# Patient Record
Sex: MALE | Race: OTHER | URBAN - METROPOLITAN AREA
[De-identification: names, ages, dates, MRNs, and addresses within clinical notes are randomized per-mention and may not be internally consistent; named-entity substitution may affect disease eponyms.]

---

## 2019-08-13 ENCOUNTER — TELEPHONE (OUTPATIENT)
Dept: INFECTIOUS DISEASES | Age: 34
End: 2019-08-13

## 2019-08-14 ENCOUNTER — TELEPHONE (OUTPATIENT)
Dept: INFECTIOUS DISEASES | Age: 34
End: 2019-08-14

## 2019-12-03 ENCOUNTER — TELEPHONE (OUTPATIENT)
Dept: INFECTIOUS DISEASES | Age: 34
End: 2019-12-03

## 2020-02-19 ENCOUNTER — TELEPHONE (OUTPATIENT)
Dept: INFECTIOUS DISEASES | Age: 35
End: 2020-02-19

## 2021-01-01 ENCOUNTER — HOSPITAL ENCOUNTER (EMERGENCY)
Age: 36
End: 2021-10-13
Attending: EMERGENCY MEDICINE
Payer: MEDICARE

## 2021-01-01 ENCOUNTER — HOSPITAL ENCOUNTER (EMERGENCY)
Age: 36
Discharge: LEFT BEFORE DISCHARGE | End: 2021-10-05
Payer: MEDICARE

## 2021-01-01 VITALS
OXYGEN SATURATION: 98 % | HEART RATE: 93 BPM | BODY MASS INDEX: 34.01 KG/M2 | WEIGHT: 265 LBS | RESPIRATION RATE: 14 BRPM | HEIGHT: 74 IN | SYSTOLIC BLOOD PRESSURE: 142 MMHG | TEMPERATURE: 97.8 F | DIASTOLIC BLOOD PRESSURE: 96 MMHG

## 2021-01-01 VITALS — OXYGEN SATURATION: 73 %

## 2021-01-01 DIAGNOSIS — I46.9 CARDIORESPIRATORY ARREST (HCC): Primary | ICD-10-CM

## 2021-01-01 DIAGNOSIS — Z53.29 LEFT AGAINST MEDICAL ADVICE: ICD-10-CM

## 2021-01-01 DIAGNOSIS — T40.601A NARCOTIC OVERDOSE, ACCIDENTAL OR UNINTENTIONAL, INITIAL ENCOUNTER (HCC): Primary | ICD-10-CM

## 2021-01-01 PROCEDURE — 92950 HEART/LUNG RESUSCITATION CPR: CPT

## 2021-01-01 PROCEDURE — 99282 EMERGENCY DEPT VISIT SF MDM: CPT

## 2021-01-01 PROCEDURE — 31500 INSERT EMERGENCY AIRWAY: CPT

## 2021-01-01 PROCEDURE — 6360000002 HC RX W HCPCS: Performed by: EMERGENCY MEDICINE

## 2021-01-01 PROCEDURE — 99284 EMERGENCY DEPT VISIT MOD MDM: CPT

## 2021-01-01 RX ORDER — NALOXONE HYDROCHLORIDE 0.4 MG/ML
INJECTION, SOLUTION INTRAMUSCULAR; INTRAVENOUS; SUBCUTANEOUS DAILY PRN
Status: COMPLETED | OUTPATIENT
Start: 2021-01-01 | End: 2021-01-01

## 2021-01-01 RX ORDER — NALOXONE HYDROCHLORIDE 1 MG/ML
INJECTION INTRAMUSCULAR; INTRAVENOUS; SUBCUTANEOUS
Status: DISCONTINUED
Start: 2021-01-01 | End: 2021-01-01 | Stop reason: HOSPADM

## 2021-01-01 RX ORDER — EPINEPHRINE 0.1 MG/ML
SYRINGE (ML) INJECTION DAILY PRN
Status: COMPLETED | OUTPATIENT
Start: 2021-01-01 | End: 2021-01-01

## 2021-01-01 RX ADMIN — EPINEPHRINE 1 MG: 0.1 INJECTION, SOLUTION ENDOTRACHEAL; INTRACARDIAC; INTRAVENOUS at 00:55

## 2021-01-01 RX ADMIN — NALOXONE HYDROCHLORIDE 2 MG: 0.4 INJECTION, SOLUTION INTRAMUSCULAR; INTRAVENOUS; SUBCUTANEOUS at 00:56

## 2021-01-01 RX ADMIN — NALOXONE HYDROCHLORIDE 4 MG: 0.4 INJECTION, SOLUTION INTRAMUSCULAR; INTRAVENOUS; SUBCUTANEOUS at 01:01

## 2021-01-01 ASSESSMENT — ENCOUNTER SYMPTOMS
WHEEZING: 0
COUGH: 0
NAUSEA: 0
VOMITING: 0
SHORTNESS OF BREATH: 0
ABDOMINAL PAIN: 0
PHOTOPHOBIA: 0

## 2021-10-06 NOTE — ED TRIAGE NOTES
Pt presents to ED via EMS following overdose on \"percocet\" Pt states that he took \"two 10mg percocets\". Pt became unresponsive; friend called 911. 4mg narcan given. Pt now a&o x3.

## 2021-10-06 NOTE — ED PROVIDER NOTES
[address] [facility] ED  EMERGENCY DEPARTMENT ENCOUNTER      Pt Name: Fernando Cash  MRN: [id number]  [place] 1985  Date of evaluation: 10/5/2021  Provider: Yuridia Luciano [place]       Chief Complaint   Patient presents with    Drug Overdose     pt unresponsive; 4mg narcan given; pt a&o x3         HISTORY OF PRESENT ILLNESS   (Location/Symptom, Timing/Onset, Context/Setting, Quality, Duration, Modifying Factors, Severity)  Note limiting factors. Fernando Cash is a 43 y.o. male who per chart review has a past medical history of anxiety asthma chronic back pain and diabetes presents to the emergency department for evaluation of drug overdose. Patient states he took two 10 mg Percocet pills while with a friend and became unresponsive. Friend called EMS. Patient received 4 mg of IV Narcan with return to normal mentation. Patient does not want help for his drug use in the ED today. He states he feels \"fine\" and wants to go home because he works all day, asking to leave the ED. Overdose was not intentional. Denies complaints. HPI    Nursing Notes were reviewed. REVIEW OF SYSTEMS    (2-9 systems for level 4, 10 or more for level 5)     Review of Systems   Constitutional: Negative for chills, fatigue and fever. HENT: Negative for congestion. Eyes: Negative for photophobia. Respiratory: Negative for cough, shortness of breath and wheezing. Cardiovascular: Negative for chest pain and palpitations. Gastrointestinal: Negative for abdominal pain, nausea and vomiting. Genitourinary: Negative for dysuria, frequency and hematuria. Musculoskeletal: Negative for myalgias. Allergic/Immunologic: Negative for immunocompromised state. Neurological: Negative for dizziness, weakness and headaches. All other systems reviewed and are negative. Except as noted above the remainder of the review of systems was reviewed and negative.        PAST MEDICAL HISTORY     Past Medical History:   Diagnosis Date    Anxiety     Asthma     Chronic back pain     Diabetes ([facility] [address]          SURGICAL HISTORY     History reviewed. No pertinent surgical history. CURRENT MEDICATIONS       Discharge Medication List as of 10/5/2021 11:26 PM      CONTINUE these medications which have NOT CHANGED    Details   pregabalin (LYRICA) 25 MG capsule Take 1 capsule by mouth every evening Valid 9/4/15-10/4/15, Disp-30 capsule, R-0             ALLERGIES     Penicillins    FAMILY HISTORY     History reviewed. No pertinent family history. SOCIAL HISTORY       Social History     Socioeconomic History    Marital status: Single     Spouse name: None    Number of children: None    Years of education: None    Highest education level: None   Occupational History    None   Tobacco Use    Smoking status: Current Every Day Smoker     Packs/day: 1.00    Smokeless tobacco: Never Used   Vaping Use    Vaping Use: Never used   Substance and Sexual Activity    Alcohol use: No     Alcohol/week: 0.0 standard drinks    Drug use: No    Sexual activity: Never   Other Topics Concern    None   Social History Narrative    None     Social Determinants of Health     Financial Resource Strain:     Difficulty of Paying Living Expenses:    Food Insecurity:     Worried About Running Out of Food in the Last Year:     Ran Out of Food in the Last Year:    Transportation Needs:     Lack of Transportation (Medical):      Lack of Transportation (Non-Medical):    Physical Activity:     Days of Exercise per Week:     Minutes of Exercise per Session:    Stress:     Feeling of Stress :    Social Connections:     Frequency of Communication with Friends and Family:     Frequency of Social Gatherings with Friends and Family:     Attends Church Services:     Active Member of Clubs or Organizations:     Attends Club or Organization Meetings:     Marital Status:    Intimate Partner Violence:     Fear of Current or Ex-Partner:     Emotionally Abused:     Physically Abused:     Sexually Abused:        SCREENINGS                        PHYSICAL EXAM    (up to 7 for level 4, 8 or more for level 5)     ED Triage Vitals [10/05/21 2250]   BP Temp Temp Source Pulse Resp SpO2 Height Weight   (!) 142/96 97.8 °F (36.6 °C) Oral 93 14 98 % 6' 2\" (1.88 m) 265 lb (120.2 kg)       Physical Exam  Constitutional:       General: He is not in acute distress. Appearance: He is well-developed. He is not ill-appearing, toxic-appearing or diaphoretic. HENT:      Head: Normocephalic and atraumatic. Right Ear: Tympanic membrane, ear canal and external ear normal.      Left Ear: Tympanic membrane, ear canal and external ear normal.      Nose: Nose normal.      Mouth/Throat:      Mouth: Mucous membranes are moist.   Eyes:      Pupils: Pupils are equal, round, and reactive to light. Cardiovascular:      Rate and Rhythm: Normal rate and regular rhythm. Pulses: Normal pulses. Heart sounds: No murmur heard. No friction rub. No gallop. Pulmonary:      Effort: Pulmonary effort is normal.      Breath sounds: Normal breath sounds. No wheezing, rhonchi or rales. Abdominal:      General: Bowel sounds are normal. There is no distension. Palpations: Abdomen is soft. Tenderness: There is no abdominal tenderness. There is no guarding or rebound. Musculoskeletal:         General: No swelling. Cervical back: Normal range of motion. Right lower leg: No edema. Left lower leg: No edema. Skin:     General: Skin is warm and dry. Capillary Refill: Capillary refill takes less than 2 seconds. Findings: No rash. Neurological:      General: No focal deficit present. Mental Status: He is alert and oriented to person, place, and time. GCS: GCS eye subscore is 4. GCS verbal subscore is 5. GCS motor subscore is 6. Cranial Nerves: Cranial nerves are intact. Sensory: Sensation is intact. Motor: Motor function is intact. Coordination: Coordination is intact. DIAGNOSTIC RESULTS     EKG: All EKG's are interpreted by the Emergency Department Physician who either signs or Co-signs this chart in the absence of a cardiologist.        RADIOLOGY:   Non-plain film images such as CT, Ultrasound and MRI are read by the radiologist. Plain radiographic images are visualized and preliminarily interpreted by the emergency physician with the below findings:        Interpretation per the Radiologist below, if available at the time of this note:    No orders to display         ED BEDSIDE ULTRASOUND:   Performed by ED Physician - none    LABS:  Labs Reviewed - No data to display    All other labs were within normal range or not returned as of this dictation. EMERGENCY DEPARTMENT COURSE and DIFFERENTIAL DIAGNOSIS/MDM:   Vitals:    Vitals:    10/05/21 2250   BP: (!) 142/96   Pulse: 93   Resp: 14   Temp: 97.8 °F (36.6 °C)   TempSrc: Oral   SpO2: 98%   Weight: 265 lb (120.2 kg)   Height: 6' 2\" (1.88 m)       MDM     Patient is a 20-year-old male presents the ED for evaluation of drug overdose. He is afebrile and hemodynamically stable. He is alert and oriented x3 with a GCS of 15 and no focal deficit. No complaints. Upon arrival to the ED patient is adamant that he wants to leave. He states that he worked all day and is tired and wants to go home. Explained to the patient that I would like to observe him for at least 1 hour to ensure he does not need any additional doses of Narcan however patient refuses. Explained to the patient that he would need to sign out [address] He was given risks of leaving the hospital [address] including but not limited to death disability loss of current lifestyle etc.  Patient verbally demonstrated understanding of these risks and signed the [facility] paperwork himself. He is alert and orient x3 with a steady gait while in the ED.   He is to return

## 2021-10-13 NOTE — ED NOTES
1047- Pt arrives to ED via PA on [facility]. Pt has [place] Airway in place. Bagging by respiratory. Pt easily bagged. Chest rise symmetrical.   0052- Pulse check- Asystole- [facility] resumed, bagging resumed. 0099- IO placed to left dave per Nakul Cowart, NISHA  4564- 1 Epi ampule given by NETTIE Munoz RN, to IO.  8331- 2mg Narcan given by NETTIE Munoz RN, to IO.   5720- Pulse check- Asystole- [facility] resumed, bagging resumed. 0101- ET tube placed by NETTIE 91Chula Landa DO. Tube size 8. 22cm at the lip.  0101- 1 Epi ampule given by NETTIE Munoz RN, to IO.   0101- 4mg Narcan given by NETTIE Munoz RN, to IO.  0102- Pulse check- Asystole- Time of Death called by NETTIE Christie RN  10/13/21 7452

## 2021-10-13 NOTE — ED NOTES
Patient taken to [facility], accompanied by [facility], belongings tagged and sent with body     Leah Johnson RN  10/13/21 2292

## 2021-10-13 NOTE — ED NOTES
Waiting on  to arrive. [facility] police at bedside.      Tiffanie Diana RN  10/13/21 0816

## 2021-10-13 NOTE — ED TRIAGE NOTES
Pt arrives to ED via [facility] in regards to overdose. EMS was called after patient was down for approximately 10 minutes. Pt was unresponsive upon arrival of EMS. Pt was placed on [facility]. Unable to intubate, arnol airway placed. Pt was given 5 epi's and 6mg Narcan en route to hospital. Pt was given 4 narcan nasal sprays by bystanders. EMS states patient was asystole, PEA, Afib (1shock given), and then asystole again. Pt asystole upon arrival to ER.

## 2021-10-13 NOTE — ED PROVIDER NOTES
[address] [facility] ED  EMERGENCY MEDICINE     Pt Name: Anabel Euceda  MRN: [id number]  [place] 1985  Date of evaluation: 10/13/2021  PCP:    Raymond Alejandro  Provider: Magi Aguirre DO    CHIEF COMPLAINT     No chief complaint on file. HISTORY OF PRESENT ILLNESS    HPI     40-year-old male presents to the emergency department unresponsive with ACLS in process. Patient was found by EMS down after he had overdosed on narcotic/heroin at a friend's house. Patient was given 4 of Narcan by the friend intranasally and 6 of Narcan by EMS with no responses. He was had a Helio airway upon arrival.  He had a lot of vomitus upon arrival.  Patient had PEA rhythm throughout with 5 epi. Patient was seen to have a V. fib rhythm and did get shocked by EMS. He had been down already 10 minutes before they got there. They state that they had been running ACLS for 30 minutes. Triage notes and Nursing notes were reviewed by myself. Any discrepancies are addressed above. PAST MEDICAL HISTORY     Past Medical History:   Diagnosis Date    Anxiety     Asthma     Chronic back pain     Diabetes ([facility] [address]        SURGICAL HISTORY     No past surgical history on file. CURRENT MEDICATIONS       Previous Medications    PREGABALIN (LYRICA) 25 MG CAPSULE    Take 1 capsule by mouth every evening Valid 9/4/15-10/4/15       ALLERGIES       Allergies   Allergen Reactions    Penicillins Hives       FAMILY HISTORY     No family history on file. SOCIAL HISTORY       Social History     Socioeconomic History    Marital status: Single     Spouse name: Not on file    Number of children: Not on file    Years of education: Not on file    Highest education level: Not on file   Occupational History    Not on file   Tobacco Use    Smoking status: Current Every Day Smoker     Packs/day: 1.00    Smokeless tobacco: Never Used   Vaping Use    Vaping Use: Never used   Substance and Sexual Activity    Alcohol use:  No Alcohol/week: 0.0 standard drinks    Drug use: No    Sexual activity: Never   Other Topics Concern    Not on file   Social History Narrative    Not on file     Social Determinants of Health     Financial Resource Strain:     Difficulty of Paying Living Expenses:    Food Insecurity:     Worried About Running Out of Food in the Last Year:     [address] in the Last Year:    Transportation Needs:     Lack of Transportation (Medical):  Lack of Transportation (Non-Medical):    Physical Activity:     Days of Exercise per Week:     Minutes of Exercise per Session:    Stress:     Feeling of Stress :    Social Connections:     Frequency of Communication with Friends and Family:     Frequency of Social Gatherings with Friends and Family:     Attends [employer] Services:     Active Member of Clubs or Organizations:     Attends Club or Organization Meetings:     Marital Status:    Intimate Partner Violence:     Fear of Current or Ex-Partner:     Emotionally Abused:     Physically Abused:     Sexually Abused:        REVIEW OF SYSTEMS     Review of Systems   Unable to perform ROS: Acuity of condition       Except as noted above the remainder of the review of systems was reviewed and is negative.   SCREENINGS        [place] Coma Scale  Eye Opening: None  Best Verbal Response: None  Best Motor Response: None  [place] Coma Scale Score: 3               PHYSICAL EXAM    (up to 7 for level 4, 8 or more for level 5)     ED Triage Vitals [10/13/21 0058]   BP Temp Temp src Pulse Resp SpO2 Height Weight   -- -- -- -- -- (!) 73 % -- --       Physical Exam  Constitutional:       Comments: Unresponsive   Eyes:      Comments: Pupils fixed and dilated   Abdominal:      Comments: Copious amounts of vomitus in the airway and on patient           DIAGNOSTIC RESULTS     EKG:(none if blank)  All EKGs are interpreted by the Emergency Department Physician who either signs or Co-signs this chart in the absence of a cardiologist.        RADIOLOGY: (none if blank)   I directly visualized the following images and reviewed the radiologist interpretations. Interpretation per the Radiologist below, if available at the time of this note:  No orders to display       LABS:  Labs Reviewed - No data to display    All other labs were within normal range or not returned as of this dictation. Please note, any cultures that may have been sent were not resulted at the time of this patient visit. EMERGENCY DEPARTMENT COURSE and Medical Decision Making:     Vitals:    Vitals:    10/13/21 0058   SpO2: (!) 73%       PROCEDURES: (None if blank)  Intubation    Date/Time: 10/13/2021 3:09 AM  Performed by: Renee Loving DO  Authorized by: Renee Loving DO     Consent:     Consent obtained:  Emergent situation  Pre-procedure details:     Patient status:  Unresponsive    Mallampati score:  II    Pretreatment medications:  None    Paralytics:  None  Procedure details:     Preoxygenation:  None    CPR in progress: yes      Intubation method:  Oral    Oral intubation technique:  Video-assisted    Laryngoscope blade: Mac 4    Tube size (mm):  8.0    Tube type:  Cuffed    Number of attempts:  1    Ventilation between attempts: no      Cricoid pressure: no      Tube visualized through cords: yes    Placement assessment:     ETT to lip:  24    Placement verification: chest rise and condensation    Post-procedure details:     Patient tolerance of procedure: Tolerated well, no immediate complications           MDM     Patient was down for about an hour. He has a known overdose. Upon chart review, I did see that he was just here on 10/5/2021 for narcotic overdose. Spoke to sisters he stated that he has been clean for about 4 years. Patient has a known narcotic abuse history. We did give 2 more epi's here in the department. We gave 6 more Narcan. We did intubate the patient successfully. We ran the code for another 15 minutes.   No response and he was in PEA repeatedly. Patient was announced dead at [address] Strict return precautions and follow up instructions were discussed with the patient with which the patient agrees    ED Medications administered this visit:    Medications   naloxone (NARCAN) 2 MG/2ML injection (has no administration in time range)   naloxone [facility]) 2 MG/2ML injection (has no administration in time range)   EPINEPHrine 1 MG/10ML injection (1 mg IntraOSSeous Given 10/13/21 0055)   naloxone [facility]) injection (4 mg IntraOSSeous Given 10/13/21 0101)         FINAL IMPRESSION      1. Cardiorespiratory arrest [facility])          DISPOSITION/PLAN   DISPOSITION  10/13/2021 02:12:31 AM      PATIENT REFERRED TO:  No follow-up provider specified.     DISCHARGE MEDICATIONS:  New Prescriptions    No medications on file              Harshil Carvalho DO (electronically signed)  Attending Physician, Emergency Department         Harshil Carvalho DO  10/13/21 [address],   10/13/21 4588

## 2021-10-13 NOTE — ED NOTES
Dr. Gopal Steward and myself to waiting room to inform family of patient's passing.       Oli Saenz RN  10/13/21 8248